# Patient Record
Sex: MALE | Race: WHITE
[De-identification: names, ages, dates, MRNs, and addresses within clinical notes are randomized per-mention and may not be internally consistent; named-entity substitution may affect disease eponyms.]

---

## 2017-08-20 ENCOUNTER — HOSPITAL ENCOUNTER (EMERGENCY)
Dept: HOSPITAL 47 - EC | Age: 37
Discharge: HOME | End: 2017-08-20
Payer: COMMERCIAL

## 2017-08-20 VITALS
SYSTOLIC BLOOD PRESSURE: 136 MMHG | DIASTOLIC BLOOD PRESSURE: 96 MMHG | TEMPERATURE: 97.3 F | RESPIRATION RATE: 18 BRPM | HEART RATE: 77 BPM

## 2017-08-20 DIAGNOSIS — H57.11: Primary | ICD-10-CM

## 2017-08-20 DIAGNOSIS — Z79.899: ICD-10-CM

## 2017-08-20 DIAGNOSIS — F41.9: ICD-10-CM

## 2017-08-20 DIAGNOSIS — I10: ICD-10-CM

## 2017-08-20 DIAGNOSIS — Z88.0: ICD-10-CM

## 2017-08-20 DIAGNOSIS — K21.9: ICD-10-CM

## 2017-08-20 PROCEDURE — 70480 CT ORBIT/EAR/FOSSA W/O DYE: CPT

## 2017-08-20 PROCEDURE — 99283 EMERGENCY DEPT VISIT LOW MDM: CPT

## 2017-08-20 NOTE — ED
General Adult HPI





- General


Chief complaint: Eye Problems


Stated complaint: FB RT EYE - [facility]


Time Seen by Provider: 08/20/17 11:51


Source: patient, RN notes reviewed


Mode of arrival: ambulatory


Limitations: no limitations





- History of Present Illness


Initial comments: 





Patient 37-year-old male who presents emergency room today with a chief 

complaint of right eye irritation off-and-on over the last 4 days.  Patient 

does admit that he was at work 4 days ago.  He states he had safety goggles on 

using a grinding wheel above his head.  He states he felt something instantly 

into the right eye.  He states he went to flush out.  He states it lasted for 

approximately 20 minutes and then began feeling better.  He states that he's 

had the symptoms come and go over the last 4 days.  He states at this time is 

currently feeling fine.  He states at times she feels a "sharp" type pain in 

the right eye.  Patient denies any other complaints or symptoms. Patient denies 

any recent fever, chills, shortness of breath, chest pain, back pain, abdominal 

pain, nausea or vomiting, numbness or tingling, dysuria or hematuria, 

constipation or diarrhea, headaches, or any other complaints.





- Related Data


 Home Medications











 Medication  Instructions  Recorded  Confirmed


 


Citalopram Hydrobromide [CeleXA] 20 mg PO DAILY 08/20/17 08/20/17


 


Famotidine [Pepcid] 20 mg PO BID 08/20/17 08/20/17


 


Losartan-Hctz 50-12.5 mg [Hyzaar 1 tab PO DAILY 08/20/17 08/20/17





50-12.5]   








 Previous Rx's











 Medication  Instructions  Recorded


 


Tobramycin 0.3% Ophth Soln [Tobrex 1 - 2 drop BOTH EYES QID 7 Days 08/20/17





0.3% Ophth Soln]  











 Allergies











Allergy/AdvReac Type Severity Reaction Status Date / Time


 


Penicillins Allergy  Rash/Hives Verified 08/20/17 11:38














Review of Systems


ROS Statement: 


Those systems with pertinent positive or pertinent negative responses have been 

documented in the HPI.





ROS Other: All systems not noted in ROS Statement are negative.





Past Medical History


Past Medical History: GERD/Reflux, Hypertension


Additional Past Medical History / Comment(s): anxiety


History of Any Multi-Drug Resistant Organisms: None Reported


Past Surgical History: No Surgical Hx Reported, Tonsillectomy


Past Psychological History: No Psychological Hx Reported


Smoking Status: Never smoker


Past Alcohol Use History: Daily


Past Drug Use History: None Reported





General Exam





- General Exam Comments


Initial Comments: 





General:  The patient is awake and alert, in no distress, and does not appear 

acutely ill. 


Eye:  Pupils are equal, round and reactive to light, extra-ocular movements are 

intact.  No nystagmus.  Mild redness to the right conjunctiva.  No signs of 

icterus.  


Ears, nose, mouth and throat:  There are moist mucous membranes and no oral 

lesions. 


Neck:  The neck is supple, there is no tenderness or JVD.  


Cardiovascular:  There is a regular rate and rhythm. No murmur, rub or gallop 

is appreciated.


Respiratory:  Lungs are clear to auscultation, respirations are non-labored, 

breath sounds are equal.  No wheezes, stridor, rales, or rhonchi.


Musculoskeletal:  Normal ROM, no tenderness.  Strength 5/5. Sensation intact. 

Pulses equal bilaterally 2+.  


Neurological:  A&O x 3. CN II-XII intact, There are no obvious motor or sensory 

deficits. Coordination appears grossly intact. Speech is normal.


Skin:  Skin is warm and dry and no rashes or lesions are noted. 


Psychiatric:  Cooperative, appropriate mood & affect, normal judgment.  


Limitations: no limitations





Course


 Vital Signs











  08/20/17





  11:31


 


Temperature 97.3 F L


 


Pulse Rate 77


 


Respiratory 18





Rate 


 


Blood Pressure 136/96


 


O2 Sat by Pulse 99





Oximetry 














Procedures





- Procedures


Initial comment: 





Patient's right eye was stained and checked with forcing with Wood's lamp 

revealing no foreign bodies.  Lids were inverted and no foreign body seen.  

Slit lamp exam was also been performed revealing no abnormality.





Medical Decision Making





- Medical Decision Making





Case discussed in detail with attending physician Dr. Calixto.  Patient 

reexamined at this time shows no signs of distress resting comfortably.  Patient

's CT reviewed and does show no sign of foreign body to right eye.  Patient's 

pain and injury was to the right eye.  Patient's CT does reveal possible 

calcification versus metallic foreign body in the left.  This is felt to be 

most likely a calcification as there is no injury or trauma and no symptoms to 

the left eye. Patient will be started on antibiotic drops advised to follow-up 

with ophthalmologist.





Disposition


Clinical Impression: 


 Pain, eye, right





Disposition: HOME SELF-CARE


Condition: Good


Instructions:  Eye Foreign Body (ED)


Additional Instructions: 


Please use antibiotic drops as prescribed and follow-up the ophthalmologist 

tomorrow.  Please return to emergency room if any symptoms increase or worsen 

or for any other concerns.


Prescriptions: 


Tobramycin 0.3% Ophth Soln [Tobrex 0.3% Ophth Soln] 1 - 2 drop BOTH EYES QID 7 

Days


Referrals: 


Chiki Arroyo MD [Primary Care Provider] - 1-2 days


Donn Alvarado MD [STAFF PHYSICIAN] - 1-2 days


Time of Disposition: 13:51

## 2017-08-20 NOTE — CT
EXAMINATION TYPE: CT orbits wo con

 

DATE OF EXAM: 8/20/2017

 

COMPARISON: NONE

 

HISTORY: Foreign body Rt eye

 

CT DLP: 478.7 mGycm

Automated exposure control for dose reduction was used.

 

FINDINGS: 

Helical imaging through the orbits. Coronal reconstructions.

 

There is metallic density coursing along the distribution of the superior oblique muscle on the left 
which is asymmetric. Lack of contrast could limit the exam. The globes show symmetric appearance. No 
other radiopaque foreign body evident. Inflammatory change present within the left maxillary sinus. O
stiomeatal units are patent.

 

IMPRESSION: 

METALLIC DENSITY PRESENT ON THE LEFT MAY BE RELATED TO THE TROCHLEA OF THE SUPERIOR OBLIQUE MUSCLE AL
THOUGH THIS IS A SYMMETRIC APPEARANCE. POSSIBLE MUCUS RETENTION CYST OR POLYP IN THE LEFT MAXILLARY S
INUS.

## 2018-07-11 ENCOUNTER — HOSPITAL ENCOUNTER (EMERGENCY)
Dept: HOSPITAL 47 - EC | Age: 38
Discharge: HOME | End: 2018-07-11
Payer: MEDICARE

## 2018-07-11 VITALS — HEART RATE: 74 BPM | TEMPERATURE: 98.2 F | SYSTOLIC BLOOD PRESSURE: 119 MMHG | DIASTOLIC BLOOD PRESSURE: 74 MMHG

## 2018-07-11 VITALS — RESPIRATION RATE: 18 BRPM

## 2018-07-11 DIAGNOSIS — K57.92: Primary | ICD-10-CM

## 2018-07-11 DIAGNOSIS — Z98.52: ICD-10-CM

## 2018-07-11 DIAGNOSIS — K21.9: ICD-10-CM

## 2018-07-11 DIAGNOSIS — Z79.899: ICD-10-CM

## 2018-07-11 DIAGNOSIS — F41.9: ICD-10-CM

## 2018-07-11 DIAGNOSIS — I10: ICD-10-CM

## 2018-07-11 DIAGNOSIS — Z88.0: ICD-10-CM

## 2018-07-11 LAB
ALBUMIN SERPL-MCNC: 4.2 G/DL (ref 3.5–5)
ALP SERPL-CCNC: 65 U/L (ref 38–126)
ALT SERPL-CCNC: 72 U/L (ref 21–72)
AMYLASE SERPL-CCNC: 42 U/L (ref 30–110)
ANION GAP SERPL CALC-SCNC: 12 MMOL/L
APTT BLD: 24.5 SEC (ref 22–30)
AST SERPL-CCNC: 26 U/L (ref 17–59)
BUN SERPL-SCNC: 15 MG/DL (ref 9–20)
CALCIUM SPEC-MCNC: 9.4 MG/DL (ref 8.4–10.2)
CELLS COUNTED: 100
CHLORIDE SERPL-SCNC: 103 MMOL/L (ref 98–107)
CO2 SERPL-SCNC: 23 MMOL/L (ref 22–30)
EOSINOPHIL # BLD MANUAL: 0.13 K/UL (ref 0–0.7)
ERYTHROCYTE [DISTWIDTH] IN BLOOD BY AUTOMATED COUNT: 4.97 M/UL (ref 4.3–5.9)
ERYTHROCYTE [DISTWIDTH] IN BLOOD: 12.4 % (ref 11.5–15.5)
GLUCOSE SERPL-MCNC: 112 MG/DL (ref 74–99)
HCT VFR BLD AUTO: 44.2 % (ref 39–53)
HGB BLD-MCNC: 15.5 GM/DL (ref 13–17.5)
INR PPP: 1.1 (ref ?–1.2)
LIPASE SERPL-CCNC: 45 U/L (ref 23–300)
LYMPHOCYTES # BLD MANUAL: 4.03 K/UL (ref 1–4.8)
MCH RBC QN AUTO: 31.3 PG (ref 25–35)
MCHC RBC AUTO-ENTMCNC: 35.1 G/DL (ref 31–37)
MCV RBC AUTO: 89 FL (ref 80–100)
MONOCYTES # BLD MANUAL: 0.63 K/UL (ref 0–1)
NEUTROPHILS NFR BLD MANUAL: 62 %
NEUTS SEG # BLD MANUAL: 7.81 K/UL (ref 1.3–7.7)
PH UR: 5.5 [PH] (ref 5–8)
PLATELET # BLD AUTO: 279 K/UL (ref 150–450)
POTASSIUM SERPL-SCNC: 4.5 MMOL/L (ref 3.5–5.1)
PROT SERPL-MCNC: 7 G/DL (ref 6.3–8.2)
PT BLD: 10.3 SEC (ref 9–12)
RBC UR QL: <1 /HPF (ref 0–5)
SODIUM SERPL-SCNC: 138 MMOL/L (ref 137–145)
SP GR UR: 1.03 (ref 1–1.03)
UROBILINOGEN UR QL STRIP: <2 MG/DL (ref ?–2)
WBC # BLD AUTO: 12.6 K/UL (ref 3.8–10.6)
WBC #/AREA URNS HPF: <1 /HPF (ref 0–5)

## 2018-07-11 PROCEDURE — 99284 EMERGENCY DEPT VISIT MOD MDM: CPT

## 2018-07-11 PROCEDURE — 83690 ASSAY OF LIPASE: CPT

## 2018-07-11 PROCEDURE — 85610 PROTHROMBIN TIME: CPT

## 2018-07-11 PROCEDURE — 80053 COMPREHEN METABOLIC PANEL: CPT

## 2018-07-11 PROCEDURE — 87040 BLOOD CULTURE FOR BACTERIA: CPT

## 2018-07-11 PROCEDURE — 96360 HYDRATION IV INFUSION INIT: CPT

## 2018-07-11 PROCEDURE — 36415 COLL VENOUS BLD VENIPUNCTURE: CPT

## 2018-07-11 PROCEDURE — 85730 THROMBOPLASTIN TIME PARTIAL: CPT

## 2018-07-11 PROCEDURE — 85025 COMPLETE CBC W/AUTO DIFF WBC: CPT

## 2018-07-11 PROCEDURE — 81001 URINALYSIS AUTO W/SCOPE: CPT

## 2018-07-11 PROCEDURE — 82150 ASSAY OF AMYLASE: CPT

## 2018-07-11 PROCEDURE — 83605 ASSAY OF LACTIC ACID: CPT

## 2018-07-11 PROCEDURE — 74177 CT ABD & PELVIS W/CONTRAST: CPT

## 2018-07-11 NOTE — CT
EXAMINATION TYPE: CT abdomen pelvis w con

 

DATE OF EXAM: 7/11/2018

 

COMPARISON: None

 

HISTORY: Left lower quadrant pain since 4:00 AM.

 

CT DLP: 1768.7 mGycm, Automated Exposure Control for Dose Reduction was Utilized.

 

CONTRAST: 

CT scan of the abdomen and pelvis is performed without oral but with IV Contrast, patient injected wi
th 100 mL of Isovue 300.

 

FINDINGS:

 

LUNG BASES: Dependent atelectasis is present bilaterally .

 

LIVER/GB: Liver is diffusely low dense consistent with fatty infiltration.

 

PANCREAS:  No significant abnormality is seen.

 

SPLEEN:  No significant abnormality is seen.

 

ADRENALS:  No significant abnormality is seen.

 

KIDNEYS:  No significant abnormality is seen.

 

BOWEL: Evaluation of bowel is slightly suboptimal secondary to lack of enteric contrast. There is non
distended stomach seen. There is no suspicious small bowel dilatation. 

 

There are a few scattered diverticula in the colon. There is a prominent diverticulum left lower quad
rant near junction of left and sigmoid colon axial image 42 with moderate ill-defined fluid and fat s
tranding. Findings are consistent with acute diverticulitis. There is mild wall thickening of the col
on from the splenic flexure through the rectum. This is presumed product of poor distention, a mild u
nderlying long segment colitis is not excluded. There is no well-formed fluid collection or abscess. 
There is no pneumoperitoneum identified.

 

PROSTATE/SEMINAL VESICLES:  No gross abnormality seen.

 

LYMPH NODES:  No greater than 1cm abdominal or pelvic lymph nodes are appreciated.

 

OSSEOUS STRUCTURES: There is transitional type L5 vertebra which is sacralized on the left.

 

OTHER: There is small amount of free fluid in pelvis axial image 84.

 

IMPRESSION: CT findings are consistent with a focal moderate acute diverticulitis in the left lower q
uadrant near junction of left and sigmoid colon.

## 2018-07-11 NOTE — ED
Abdominal Pain HPI





- General


Chief Complaint: Abdominal Pain


Stated Complaint: LLQ PAIN


Time Seen by Provider: 07/11/18 09:37


Source: patient, RN notes reviewed


Mode of arrival: ambulatory


Limitations: no limitations





- History of Present Illness


Initial Comments: 





38-year-old male present emergency department with chief complaint of left 

lower quadrant abdominal pain.  He states he had no symptoms yesterday but 

states that he woke up around 4:00 this morning with stabbing pain in his left 

lower quadrant.  He has no history of diverticulitis or bowel infections.  

Denies any recent injury.  He states he did have a vasectomy 2 weeks ago with 

symptoms of swelling and pain have improved from that.  Patient reports fever 

no chills no night sweats.  Denies any change of bowel habits including 

diarrhea melena mucus or blood.  Patient states he's been urinating fine no 

dysuria no hematuria or urinary frequency.  Patient states he has no back pain 

or flank pain and no history kidney stones.





- Related Data


 Home Medications











 Medication  Instructions  Recorded  Confirmed


 


Citalopram Hydrobromide [CeleXA] 20 mg PO DAILY 08/20/17 07/11/18


 


Famotidine [Pepcid] 20 mg PO BID 08/20/17 07/11/18


 


Losartan-Hctz 50-12.5 mg [Hyzaar 1 tab PO DAILY 08/20/17 07/11/18





50-12.5]   








 Previous Rx's











 Medication  Instructions  Recorded


 


Ciprofloxacin HCl [Cipro] 500 mg PO Q12HR #20 tablet 07/11/18


 


metroNIDAZOLE [Flagyl] 500 mg PO TID #30 tab 07/11/18











 Allergies











Allergy/AdvReac Type Severity Reaction Status Date / Time


 


Penicillins Allergy  Rash/Hives Verified 07/11/18 10:03














Review of Systems


ROS Statement: 


Those systems with pertinent positive or pertinent negative responses have been 

documented in the HPI.





ROS Other: All systems not noted in ROS Statement are negative.





Past Medical History


Past Medical History: GERD/Reflux, Hypertension


Additional Past Medical History / Comment(s): anxiety


History of Any Multi-Drug Resistant Organisms: None Reported


Past Surgical History: Tonsillectomy


Additional Past Surgical History / Comment(s): vasectomy


Past Psychological History: No Psychological Hx Reported


Smoking Status: Never smoker


Past Alcohol Use History: Daily


Past Drug Use History: None Reported





General Exam


Limitations: no limitations


General appearance: alert, in no apparent distress


Respiratory exam: Present: normal lung sounds bilaterally.  Absent: respiratory 

distress, wheezes, rales, rhonchi, stridor


Cardiovascular Exam: Present: regular rate, normal rhythm, normal heart sounds.

  Absent: systolic murmur, diastolic murmur, rubs, gallop, clicks


GI/Abdominal exam: Present: soft, tenderness (Moderate left lower quadrant 

tenderness), normal bowel sounds.  Absent: distended, guarding, rebound, rigid


Back exam: Absent: CVA tenderness (R), CVA tenderness (L)


Skin exam: Present: warm, dry, intact, normal color.  Absent: rash





Course


 Vital Signs











  07/11/18 07/11/18





  09:33 11:23


 


Temperature 98.3 F 98.2 F


 


Pulse Rate 88 74


 


Respiratory 18 18





Rate  


 


Blood Pressure 124/84 119/74


 


O2 Sat by Pulse 97 97





Oximetry  














Medical Decision Making





- Medical Decision Making





38-year-old male present emergency from for left lower quadrant abdominal pain.

  Patient had laboratory, CT which shows evidence of moderate diverticulitis.  

There is no abscess or perforation.  Patient will be started on Cipro, Flagyl 

and follow-up with on-call surgery.  Return parameters were discussed.





- Lab Data


Result diagrams: 


 07/11/18 10:21





 07/11/18 10:21


 Lab Results











  07/11/18 07/11/18 07/11/18 Range/Units





  10:21 10:21 10:21 


 


WBC   12.6 H   (3.8-10.6)  k/uL


 


RBC   4.97   (4.30-5.90)  m/uL


 


Hgb   15.5   (13.0-17.5)  gm/dL


 


Hct   44.2   (39.0-53.0)  %


 


MCV   89.0   (80.0-100.0)  fL


 


MCH   31.3   (25.0-35.0)  pg


 


MCHC   35.1   (31.0-37.0)  g/dL


 


RDW   12.4   (11.5-15.5)  %


 


Plt Count   279   (150-450)  k/uL


 


PT     (9.0-12.0)  sec


 


INR     (<1.2)  


 


APTT     (22.0-30.0)  sec


 


Sodium  138    (137-145)  mmol/L


 


Potassium  4.5    (3.5-5.1)  mmol/L


 


Chloride  103    ()  mmol/L


 


Carbon Dioxide  23    (22-30)  mmol/L


 


Anion Gap  12    mmol/L


 


BUN  15    (9-20)  mg/dL


 


Creatinine  0.74    (0.66-1.25)  mg/dL


 


Est GFR (CKD-EPI)AfAm  >90    (>60 ml/min/1.73 sqM)  


 


Est GFR (CKD-EPI)NonAf  >90    (>60 ml/min/1.73 sqM)  


 


Glucose  112 H    (74-99)  mg/dL


 


Plasma Lactic Acid Efrain    0.6 L  (0.7-2.0)  mmol/L


 


Calcium  9.4    (8.4-10.2)  mg/dL


 


Total Bilirubin  1.3    (0.2-1.3)  mg/dL


 


AST  26    (17-59)  U/L


 


ALT  72    (21-72)  U/L


 


Alkaline Phosphatase  65    ()  U/L


 


Total Protein  7.0    (6.3-8.2)  g/dL


 


Albumin  4.2    (3.5-5.0)  g/dL


 


Amylase  42    ()  U/L


 


Lipase  45    ()  U/L


 


Urine Color     


 


Urine Appearance     (Clear)  


 


Urine pH     (5.0-8.0)  


 


Ur Specific Gravity     (1.001-1.035)  


 


Urine Protein     (Negative)  


 


Urine Glucose (UA)     (Negative)  


 


Urine Ketones     (Negative)  


 


Urine Blood     (Negative)  


 


Urine Nitrite     (Negative)  


 


Urine Bilirubin     (Negative)  


 


Urine Urobilinogen     (<2.0)  mg/dL


 


Ur Leukocyte Esterase     (Negative)  


 


Urine RBC     (0-5)  /hpf


 


Urine WBC     (0-5)  /hpf


 


Urine Mucus     (None)  /hpf














  07/11/18 07/11/18 Range/Units





  10:21 10:21 


 


WBC    (3.8-10.6)  k/uL


 


RBC    (4.30-5.90)  m/uL


 


Hgb    (13.0-17.5)  gm/dL


 


Hct    (39.0-53.0)  %


 


MCV    (80.0-100.0)  fL


 


MCH    (25.0-35.0)  pg


 


MCHC    (31.0-37.0)  g/dL


 


RDW    (11.5-15.5)  %


 


Plt Count    (150-450)  k/uL


 


PT  10.3   (9.0-12.0)  sec


 


INR  1.1   (<1.2)  


 


APTT  24.5   (22.0-30.0)  sec


 


Sodium    (137-145)  mmol/L


 


Potassium    (3.5-5.1)  mmol/L


 


Chloride    ()  mmol/L


 


Carbon Dioxide    (22-30)  mmol/L


 


Anion Gap    mmol/L


 


BUN    (9-20)  mg/dL


 


Creatinine    (0.66-1.25)  mg/dL


 


Est GFR (CKD-EPI)AfAm    (>60 ml/min/1.73 sqM)  


 


Est GFR (CKD-EPI)NonAf    (>60 ml/min/1.73 sqM)  


 


Glucose    (74-99)  mg/dL


 


Plasma Lactic Acid Efrain    (0.7-2.0)  mmol/L


 


Calcium    (8.4-10.2)  mg/dL


 


Total Bilirubin    (0.2-1.3)  mg/dL


 


AST    (17-59)  U/L


 


ALT    (21-72)  U/L


 


Alkaline Phosphatase    ()  U/L


 


Total Protein    (6.3-8.2)  g/dL


 


Albumin    (3.5-5.0)  g/dL


 


Amylase    ()  U/L


 


Lipase    ()  U/L


 


Urine Color   Yellow  


 


Urine Appearance   Clear  (Clear)  


 


Urine pH   5.5  (5.0-8.0)  


 


Ur Specific Gravity   1.026  (1.001-1.035)  


 


Urine Protein   Trace H  (Negative)  


 


Urine Glucose (UA)   Negative  (Negative)  


 


Urine Ketones   Negative  (Negative)  


 


Urine Blood   Trace H  (Negative)  


 


Urine Nitrite   Negative  (Negative)  


 


Urine Bilirubin   Negative  (Negative)  


 


Urine Urobilinogen   <2.0  (<2.0)  mg/dL


 


Ur Leukocyte Esterase   Negative  (Negative)  


 


Urine RBC   <1  (0-5)  /hpf


 


Urine WBC   <1  (0-5)  /hpf


 


Urine Mucus   Many H  (None)  /hpf














Disposition


Clinical Impression: 


 Diverticulitis





Disposition: HOME SELF-CARE


Condition: Stable


Instructions:  Diverticulitis Diet (ED), Diverticulitis (ED)


Additional Instructions: 


Please return to the Emergency Department if symptoms worsen or any other 

concerns.


Prescriptions: 


Ciprofloxacin HCl [Cipro] 500 mg PO Q12HR #20 tablet


metroNIDAZOLE [Flagyl] 500 mg PO TID #30 tab


Is patient prescribed a controlled substance at d/c from ED?: No


Referrals: 


Chiki Arroyo MD [Primary Care Provider] - 1-2 days


Laurie Galaviz MD [STAFF PHYSICIAN] - 1-2 days


Time of Disposition: 11:45

## 2020-03-03 ENCOUNTER — HOSPITAL ENCOUNTER (EMERGENCY)
Dept: HOSPITAL 47 - EC | Age: 40
Discharge: HOME | End: 2020-03-03
Payer: MEDICARE

## 2020-03-03 VITALS
RESPIRATION RATE: 17 BRPM | HEART RATE: 71 BPM | TEMPERATURE: 98.1 F | SYSTOLIC BLOOD PRESSURE: 118 MMHG | DIASTOLIC BLOOD PRESSURE: 76 MMHG

## 2020-03-03 DIAGNOSIS — F41.9: ICD-10-CM

## 2020-03-03 DIAGNOSIS — I10: ICD-10-CM

## 2020-03-03 DIAGNOSIS — Z88.0: ICD-10-CM

## 2020-03-03 DIAGNOSIS — R11.0: ICD-10-CM

## 2020-03-03 DIAGNOSIS — Z79.899: ICD-10-CM

## 2020-03-03 DIAGNOSIS — N50.811: Primary | ICD-10-CM

## 2020-03-03 DIAGNOSIS — K21.9: ICD-10-CM

## 2020-03-03 LAB
ALBUMIN SERPL-MCNC: 4.4 G/DL (ref 3.5–5)
ALP SERPL-CCNC: 89 U/L (ref 38–126)
ALT SERPL-CCNC: 82 U/L (ref 4–49)
AMYLASE SERPL-CCNC: 42 U/L (ref 30–110)
ANION GAP SERPL CALC-SCNC: 10 MMOL/L
APTT BLD: 22.7 SEC (ref 22–30)
AST SERPL-CCNC: 39 U/L (ref 17–59)
BUN SERPL-SCNC: 16 MG/DL (ref 9–20)
CALCIUM SPEC-MCNC: 9 MG/DL (ref 8.4–10.2)
CELLS COUNTED: 100
CHLORIDE SERPL-SCNC: 100 MMOL/L (ref 98–107)
CO2 SERPL-SCNC: 26 MMOL/L (ref 22–30)
ERYTHROCYTE [DISTWIDTH] IN BLOOD BY AUTOMATED COUNT: 5.37 M/UL (ref 4.3–5.9)
ERYTHROCYTE [DISTWIDTH] IN BLOOD: 11.7 % (ref 11.5–15.5)
GLUCOSE SERPL-MCNC: 118 MG/DL (ref 74–99)
HCT VFR BLD AUTO: 46.2 % (ref 39–53)
HGB BLD-MCNC: 16.3 GM/DL (ref 13–17.5)
INR PPP: 1 (ref ?–1.2)
LYMPHOCYTES # BLD MANUAL: 2.91 K/UL (ref 1–4.8)
MCH RBC QN AUTO: 30.3 PG (ref 25–35)
MCHC RBC AUTO-ENTMCNC: 35.2 G/DL (ref 31–37)
MCV RBC AUTO: 86 FL (ref 80–100)
MONOCYTES # BLD MANUAL: 0.58 K/UL (ref 0–1)
NEUTROPHILS NFR BLD MANUAL: 58 %
NEUTS SEG # BLD MANUAL: 4.81 K/UL (ref 1.3–7.7)
PH UR: 5.5 [PH] (ref 5–8)
PLATELET # BLD AUTO: 249 K/UL (ref 150–450)
POTASSIUM SERPL-SCNC: 3.6 MMOL/L (ref 3.5–5.1)
PROT SERPL-MCNC: 7.3 G/DL (ref 6.3–8.2)
PROT UR QL: (no result)
PT BLD: 10.7 SEC (ref 9–12)
SODIUM SERPL-SCNC: 136 MMOL/L (ref 137–145)
SP GR UR: 1.04 (ref 1–1.03)
SQUAMOUS UR QL AUTO: 1 /HPF (ref 0–4)
UROBILINOGEN UR QL STRIP: 2 MG/DL (ref ?–2)
WBC # BLD AUTO: 8.3 K/UL (ref 3.8–10.6)
WBC # UR AUTO: 1 /HPF (ref 0–5)

## 2020-03-03 PROCEDURE — 81001 URINALYSIS AUTO W/SCOPE: CPT

## 2020-03-03 PROCEDURE — 96374 THER/PROPH/DIAG INJ IV PUSH: CPT

## 2020-03-03 PROCEDURE — 85730 THROMBOPLASTIN TIME PARTIAL: CPT

## 2020-03-03 PROCEDURE — 99285 EMERGENCY DEPT VISIT HI MDM: CPT

## 2020-03-03 PROCEDURE — 82150 ASSAY OF AMYLASE: CPT

## 2020-03-03 PROCEDURE — 93975 VASCULAR STUDY: CPT

## 2020-03-03 PROCEDURE — 82272 OCCULT BLD FECES 1-3 TESTS: CPT

## 2020-03-03 PROCEDURE — 96375 TX/PRO/DX INJ NEW DRUG ADDON: CPT

## 2020-03-03 PROCEDURE — 74018 RADEX ABDOMEN 1 VIEW: CPT

## 2020-03-03 PROCEDURE — 76870 US EXAM SCROTUM: CPT

## 2020-03-03 PROCEDURE — 80053 COMPREHEN METABOLIC PANEL: CPT

## 2020-03-03 PROCEDURE — 96361 HYDRATE IV INFUSION ADD-ON: CPT

## 2020-03-03 PROCEDURE — 85025 COMPLETE CBC W/AUTO DIFF WBC: CPT

## 2020-03-03 PROCEDURE — 83690 ASSAY OF LIPASE: CPT

## 2020-03-03 PROCEDURE — 74177 CT ABD & PELVIS W/CONTRAST: CPT

## 2020-03-03 PROCEDURE — 85610 PROTHROMBIN TIME: CPT

## 2020-03-03 PROCEDURE — 36415 COLL VENOUS BLD VENIPUNCTURE: CPT

## 2020-03-03 NOTE — ED
Abdominal Pain HPI





- General


Chief Complaint: Abdominal Pain


Stated Complaint: abdominal pain


Time Seen by Provider: 03/03/20 08:53


Source: patient, RN notes reviewed, old records reviewed


Mode of arrival: ambulatory


Limitations: no limitations





- History of Present Illness


Initial Comments: 





Patient is a pleasant 39-year-old male presents emergency department today for 

evaluation for 3 days of general nausea, "knots in his stomach and ".  Patient 

reports that he's had some soft stools yesterday.  Patient reports that his main

concern was the pain seemed to be worsening in his lower abdomen and into his 

right scrotum and right testicle over the past few hours.  Patient reports he 

just got off of a night shift as a .  Patient reports that he has had

no specific fevers or chills.  He did notice that his urine has been somewhat 

darker than normal.  Patient denies any associated chest pain or shortness of 

breath.





- Related Data


                                Home Medications











 Medication  Instructions  Recorded  Confirmed


 


Citalopram Hydrobromide [CeleXA] 20 mg PO DAILY 08/20/17 07/11/18


 


Famotidine [Pepcid] 20 mg PO BID 08/20/17 07/11/18


 


Losartan-Hctz 50-12.5 mg [Hyzaar 1 tab PO DAILY 08/20/17 07/11/18





50-12.5]   








                                  Previous Rx's











 Medication  Instructions  Recorded


 


Ciprofloxacin HCl [Cipro] 500 mg PO Q12HR #20 tablet 07/11/18


 


metroNIDAZOLE [Flagyl] 500 mg PO TID #30 tab 07/11/18


 


Ibuprofen [Motrin] 600 mg PO Q8HR PRN #30 tab 03/03/20











                                    Allergies











Allergy/AdvReac Type Severity Reaction Status Date / Time


 


Penicillins Allergy  Rash/Hives Verified 03/03/20 08:52














Review of Systems


ROS Statement: 


Those systems with pertinent positive or pertinent negative responses have been 

documented in the HPI.





ROS Other: All systems not noted in ROS Statement are negative.





Past Medical History


Past Medical History: GERD/Reflux, Hypertension


Additional Past Medical History / Comment(s): anxiety


History of Any Multi-Drug Resistant Organisms: None Reported


Past Surgical History: Tonsillectomy


Additional Past Surgical History / Comment(s): vasectomy


Past Psychological History: No Psychological Hx Reported


Smoking Status: Never smoker


Past Alcohol Use History: Occasional


Past Drug Use History: None Reported





General Exam





- General Exam Comments


Initial Comments: 





39 year old male no distress.


Limitations: no limitations


General appearance: alert, in no apparent distress


Head exam: Present: atraumatic, normocephalic, normal inspection


Eye exam: Present: normal appearance, PERRL, EOMI.  Absent: scleral icterus, 

conjunctival injection, periorbital swelling


ENT exam: Present: normal exam, mucous membranes moist


Neck exam: Present: normal inspection.  Absent: tenderness, meningismus, 

lymphadenopathy


Respiratory exam: Present: normal lung sounds bilaterally.  Absent: respiratory 

distress, wheezes, rales, rhonchi, stridor


Cardiovascular Exam: Present: regular rate, normal rhythm, normal heart sounds. 

 Absent: systolic murmur, diastolic murmur, rubs, gallop, clicks


GI/Abdominal exam: Present: soft, normal bowel sounds.  Absent: distended, 

tenderness, guarding, rebound, rigid


 exam: Present: testicular tenderness (right )


Extremities exam: Present: normal inspection, full ROM, normal capillary refill.

  Absent: tenderness, pedal edema, joint swelling, calf tenderness


Back exam: Present: normal inspection


Neurological exam: Present: alert, oriented X3, CN II-XII intact


Psychiatric exam: Present: normal affect, normal mood


Skin exam: Present: warm, dry, intact, normal color.  Absent: rash





Course


                                   Vital Signs











  03/03/20 03/03/20





  08:49 12:59


 


Temperature 98.6 F 98.1 F


 


Pulse Rate 79 71


 


Respiratory 18 17





Rate  


 


Blood Pressure 132/72 118/76


 


O2 Sat by Pulse 98 95





Oximetry  














Medical Decision Making





- Medical Decision Making





39 year old male presents with nausea, testicular pain starting over 8 hours 

ago. Patient had normal labs and normal ua. Patient scrotal us shows no torsion.

 evidence of varicocele.Patient had ct showing possible small bowel ileus. 

Patient has no vomiting. Discussed case with doctor kush, whom also examined 

patient. Discussed for testicular pain to use antiinflammatory medication. 





- Lab Data


Result diagrams: 


                                 03/03/20 09:34





                                 03/03/20 09:34


                                   Lab Results











  03/03/20 03/03/20 03/03/20 Range/Units





  09:34 09:34 09:34 


 


WBC  8.3    (3.8-10.6)  k/uL


 


RBC  5.37    (4.30-5.90)  m/uL


 


Hgb  16.3    (13.0-17.5)  gm/dL


 


Hct  46.2    (39.0-53.0)  %


 


MCV  86.0    (80.0-100.0)  fL


 


MCH  30.3    (25.0-35.0)  pg


 


MCHC  35.2    (31.0-37.0)  g/dL


 


RDW  11.7    (11.5-15.5)  %


 


Plt Count  249    (150-450)  k/uL


 


Neutrophils % (Manual)  58    %


 


Lymphocytes % (Manual)  35    %


 


Monocytes % (Manual)  7    %


 


Neutrophils # (Manual)  4.81    (1.3-7.7)  k/uL


 


Lymphocytes # (Manual)  2.91    (1.0-4.8)  k/uL


 


Monocytes # (Manual)  0.58    (0-1.0)  k/uL


 


Nucleated RBCs  0    (0-0)  /100 WBC


 


Manual Slide Review  Performed    


 


RBC Morphology  Normal    


 


PT   10.7   (9.0-12.0)  sec


 


INR   1.0   (<1.2)  


 


APTT   22.7   (22.0-30.0)  sec


 


Sodium     (137-145)  mmol/L


 


Potassium     (3.5-5.1)  mmol/L


 


Chloride     ()  mmol/L


 


Carbon Dioxide     (22-30)  mmol/L


 


Anion Gap     mmol/L


 


BUN     (9-20)  mg/dL


 


Creatinine     (0.66-1.25)  mg/dL


 


Est GFR (CKD-EPI)AfAm     (>60 ml/min/1.73 sqM)  


 


Est GFR (CKD-EPI)NonAf     (>60 ml/min/1.73 sqM)  


 


Glucose     (74-99)  mg/dL


 


Calcium     (8.4-10.2)  mg/dL


 


Total Bilirubin     (0.2-1.3)  mg/dL


 


AST     (17-59)  U/L


 


ALT     (4-49)  U/L


 


Alkaline Phosphatase     ()  U/L


 


Total Protein     (6.3-8.2)  g/dL


 


Albumin     (3.5-5.0)  g/dL


 


Amylase     ()  U/L


 


Lipase     ()  U/L


 


Urine Color    Yellow  


 


Urine Appearance    Clear  (Clear)  


 


Urine pH    5.5  (5.0-8.0)  


 


Ur Specific Gravity    1.040 H  (1.001-1.035)  


 


Urine Protein    1+ H  (Negative)  


 


Urine Glucose (UA)    Negative  (Negative)  


 


Urine Ketones    Negative  (Negative)  


 


Urine Blood    Negative  (Negative)  


 


Urine Nitrite    Negative  (Negative)  


 


Urine Bilirubin    Negative  (Negative)  


 


Urine Urobilinogen    2.0  (<2.0)  mg/dL


 


Ur Leukocyte Esterase    Negative  (Negative)  


 


Urine WBC    1  (0-5)  /hpf


 


Ur Squamous Epith Cells    1  (0-4)  /hpf


 


Urine Mucus    Many H  (None)  /hpf


 


Stool Occult Blood     (Negative)  














  03/03/20 03/03/20 Range/Units





  09:34 09:34 


 


WBC    (3.8-10.6)  k/uL


 


RBC    (4.30-5.90)  m/uL


 


Hgb    (13.0-17.5)  gm/dL


 


Hct    (39.0-53.0)  %


 


MCV    (80.0-100.0)  fL


 


MCH    (25.0-35.0)  pg


 


MCHC    (31.0-37.0)  g/dL


 


RDW    (11.5-15.5)  %


 


Plt Count    (150-450)  k/uL


 


Neutrophils % (Manual)    %


 


Lymphocytes % (Manual)    %


 


Monocytes % (Manual)    %


 


Neutrophils # (Manual)    (1.3-7.7)  k/uL


 


Lymphocytes # (Manual)    (1.0-4.8)  k/uL


 


Monocytes # (Manual)    (0-1.0)  k/uL


 


Nucleated RBCs    (0-0)  /100 WBC


 


Manual Slide Review    


 


RBC Morphology    


 


PT    (9.0-12.0)  sec


 


INR    (<1.2)  


 


APTT    (22.0-30.0)  sec


 


Sodium  136 L   (137-145)  mmol/L


 


Potassium  3.6   (3.5-5.1)  mmol/L


 


Chloride  100   ()  mmol/L


 


Carbon Dioxide  26   (22-30)  mmol/L


 


Anion Gap  10   mmol/L


 


BUN  16   (9-20)  mg/dL


 


Creatinine  0.66   (0.66-1.25)  mg/dL


 


Est GFR (CKD-EPI)AfAm  >90   (>60 ml/min/1.73 sqM)  


 


Est GFR (CKD-EPI)NonAf  >90   (>60 ml/min/1.73 sqM)  


 


Glucose  118 H   (74-99)  mg/dL


 


Calcium  9.0   (8.4-10.2)  mg/dL


 


Total Bilirubin  0.5   (0.2-1.3)  mg/dL


 


AST  39   (17-59)  U/L


 


ALT  82 H   (4-49)  U/L


 


Alkaline Phosphatase  89   ()  U/L


 


Total Protein  7.3   (6.3-8.2)  g/dL


 


Albumin  4.4   (3.5-5.0)  g/dL


 


Amylase  42   ()  U/L


 


Lipase  115   ()  U/L


 


Urine Color    


 


Urine Appearance    (Clear)  


 


Urine pH    (5.0-8.0)  


 


Ur Specific Gravity    (1.001-1.035)  


 


Urine Protein    (Negative)  


 


Urine Glucose (UA)    (Negative)  


 


Urine Ketones    (Negative)  


 


Urine Blood    (Negative)  


 


Urine Nitrite    (Negative)  


 


Urine Bilirubin    (Negative)  


 


Urine Urobilinogen    (<2.0)  mg/dL


 


Ur Leukocyte Esterase    (Negative)  


 


Urine WBC    (0-5)  /hpf


 


Ur Squamous Epith Cells    (0-4)  /hpf


 


Urine Mucus    (None)  /hpf


 


Stool Occult Blood   Negative  (Negative)  














- Radiology Data


Radiology results: report reviewed


KUB shows normal bowel gas pattern. US scrotum shows varicocele.  CT shows 

multiple loops of upper limits normal size bowel inlet abdomen with airlfluid 

levels and decompressed small bowel in right lower quadrant. No transition point

 seen and small bowel ileum is favored. hepatic steatosis appearing moderate 

grade with probable focal fatty sparing near gallbladder fossa. 





Disposition


Clinical Impression: 


 Testicular pain, Nausea





Disposition: HOME SELF-CARE


Condition: Good


Instructions (If sedation given, give patient instructions):  Varicocele (ED), 

Testicle Pain (ED)


Additional Instructions: 


Please use medication as discussed. Please follow up with family doctor if 

symptoms have not improved over the next two days. Please return to the 

emergency room if your symptoms increase or worsen or for any other concern.


Prescriptions: 


Ibuprofen [Motrin] 600 mg PO Q8HR PRN #30 tab


 PRN Reason: Pain


Is patient prescribed a controlled substance at d/c from ED?: No


Referrals: 


Chiki Arroyo MD [Primary Care Provider] - 1-2 days


Time of Disposition: 12:45

## 2020-03-03 NOTE — XR
EXAMINATION TYPE: XR KUB

 

DATE OF EXAM: 3/3/2020

 

COMPARISON: None

 

INDICATION: Abdomen pain x4 days

 

TECHNIQUE: Single view abdomen upright view

 

FINDINGS:  

Colonic bowel gas is present. No suspicious differential air-fluid levels are present. No mass effect
 is evident

Psoas margins are normal.

No organomegaly is present.

 

 

IMPRESSION: 

1. Nonspecific colonic bowel gas.

## 2020-03-03 NOTE — US
EXAMINATION TYPE: US scrotum with doppler.  Grayscale and color Doppler Duplex imaging performed of t
he scrotum.

 

DATE OF EXAM: 3/3/2020

 

COMPARISON: NONE

 

CLINICAL HISTORY: right testicular pain.

 

 

EXAM MEASUREMENTS:

 

TESTICLES:

Right Testicle:  3.7 x 2.0 x 2.9 cm

Left Testicle:  4.2 x 1.9 x 2.7 cm

 

EPIDIDYMIS HEAD:

Right Epididymis:  0.6 cm

Left Epididymis:  0.8 cm  

 

Doppler performed to assess for testicular vascularity; good bilateral color flow and waveforms are s
een.   There is no evidence of testicular torsion.

 

Presence of hydroceles:  No

Presence of varicoceles:  Yes, small on the left 

 

Slightly prominent and hypervascular right side epididymis, possible right sided epididymitis 

 

 

 

IMPRESSION: 

1. Small left-sided varicoceles

## 2020-03-03 NOTE — CT
EXAMINATION TYPE: CT abdomen pelvis w con

 

DATE OF EXAM: 3/3/2020

 

COMPARISON: Scrotal ultrasound of the same date and CT dated 7/11/2018

 

HISTORY: scrotal pain

 

CT DLP: 1778 mGycm

Automated exposure control for dose reduction was used.

 

TECHNIQUE:  Helical acquisition of images was performed from the lung bases through the pelvis.

 

CONTRAST: 

Performed without Oral Contrast and with IV Contrast, patient injected with 100 mL of Isovue 300.

 

FINDINGS: 

 

LUNG BASES: Minimal bibasilar subsegmental dependent atelectasis

 

LIVER/GB: Hepatic parenchyma is diffusely hypoattenuated in comparison to that of the spleen, most co
mmonly seen in hepatic steatosis. This finding limits evaluation for hepatic masses. There is probabl
e focal fatty sparing around the gallbladder fossa with relative hyperattenuation. No intrahepatic bi
liary ductal dilatation. No cholelithiasis.

 

PANCREAS: No significant abnormality is seen.

 

SPLEEN: No significant abnormality is seen.

 

ADRENALS: No nodularity or thickening.

 

KIDNEYS: No hydronephrosis or nephrolithiasis.

 

FREE AIR:  No free air is visualized.

 

ADENOPATHY:  No greater than 1 cm short axis lymph node in the abdomen or pelvis.

 

OSSEOUS STRUCTURES:  Nonspecific lucent lesion of the left iliac bone measures 8 mm appearing stable 
from 2018.

 

BOWEL:  Small bowel loops are upper limits of normal size containing few air-fluid levels in the left
 mid abdomen. Although no transition point is seen the small bowel loops in the right mid abdomen and
 right lower quadrant are decompressed. There is an appendicolith in the nondilated appendix. No brendan
appendiceal fat stranding. Descending colon is decompressed.

 

IMPRESSION: 

1. MULTIPLE LOOPS OF UPPER LIMITS NORMAL SIZE SMALL BOWEL IN THE LEFT MID ABDOMEN WITH AIR-FLUID LEVE
LS AND DECOMPRESSED SMALL BOWEL IN THE RIGHT LOWER QUADRANT. NO DISTINCT TRANSITION POINT IS SEEN AND
 SMALL BOWEL ILEUS IS FAVORED.

2. HEPATIC STEATOSIS APPEARING MODERATE GRADE WITH PROBABLE FOCAL FATTY SPARING NEAR THE GALLBLADDER 
FOSSA.

## 2020-03-13 ENCOUNTER — HOSPITAL ENCOUNTER (OUTPATIENT)
Age: 40
End: 2020-03-13
Payer: COMMERCIAL

## 2020-03-13 PROCEDURE — 43239 EGD BIOPSY SINGLE/MULTIPLE: CPT

## 2020-03-13 PROCEDURE — 88305 TISSUE EXAM BY PATHOLOGIST: CPT

## 2020-03-13 PROCEDURE — 45378 DIAGNOSTIC COLONOSCOPY: CPT

## 2020-09-25 ENCOUNTER — HOSPITAL ENCOUNTER (EMERGENCY)
Dept: HOSPITAL 47 - EC | Age: 40
Discharge: HOME | End: 2020-09-25
Payer: COMMERCIAL

## 2020-09-25 VITALS — TEMPERATURE: 97.8 F | RESPIRATION RATE: 18 BRPM

## 2020-09-25 VITALS — DIASTOLIC BLOOD PRESSURE: 65 MMHG | HEART RATE: 94 BPM | SYSTOLIC BLOOD PRESSURE: 106 MMHG

## 2020-09-25 DIAGNOSIS — K21.9: ICD-10-CM

## 2020-09-25 DIAGNOSIS — S52.501A: Primary | ICD-10-CM

## 2020-09-25 DIAGNOSIS — S52.611A: ICD-10-CM
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
## 2020-09-25 DIAGNOSIS — Z88.0: ICD-10-CM
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
## 2020-09-25 DIAGNOSIS — I10: ICD-10-CM

## 2020-09-25 DIAGNOSIS — Z79.899: ICD-10-CM
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
## 2020-09-25 PROCEDURE — 73080 X-RAY EXAM OF ELBOW: CPT

## 2020-09-25 PROCEDURE — 99283 EMERGENCY DEPT VISIT LOW MDM: CPT

## 2020-09-25 PROCEDURE — 64450 NJX AA&/STRD OTHER PN/BRANCH: CPT

## 2020-09-25 PROCEDURE — 96372 THER/PROPH/DIAG INJ SC/IM: CPT

## 2020-09-25 PROCEDURE — 29125 APPL SHORT ARM SPLINT STATIC: CPT

## 2020-09-25 PROCEDURE — 73090 X-RAY EXAM OF FOREARM: CPT

## 2020-09-25 NOTE — ED
Upper Extremity HPI





- General


Chief Complaint: Extremity Injury, Upper


Stated Complaint: R Arm Injury


Time Seen by Provider: 09/25/20 20:57


Source: patient


Mode of arrival: ambulatory


Limitations: no limitations





- History of Present Illness


Initial Comments: 





Patient is 40-year-old male presenting to the emergency department with chief 

complaint of right arm pain.  Patient states he was going up a hill, lost 

balance and went backwards.  States he attempted to brace himself during the 

fall with his right arm and developed pain.  Patient states most of the pain is 

in his right elbow along the forearm as well.  Patient states the pain is 10/10 

and sharp in nature.  States he does not want to move his arm due to pain.  

Denies any numbness or tingling.  Denies taking medications to alleviate the 

symptoms.  States this occurred about half hour prior to arrival.





- Related Data


                                Home Medications











 Medication  Instructions  Recorded  Confirmed


 


Omeprazole 20 mg PO DAILY 03/11/20 09/25/20


 


Losartan Potassium 100 mg PO DAILY 09/25/20 09/25/20


 


Metoprolol Succinate (ER) [Toprol 50 mg PO DAILY 09/25/20 09/25/20





Xl]   


 


Sildenafil Citrate 100 mg PO DAILY PRN 09/25/20 09/25/20


 


amLODIPine [Norvasc] 5 mg PO DAILY 09/25/20 09/25/20


 


hydroCHLOROthiazide [Hydrodiuril] 25 mg PO DAILY 09/25/20 09/25/20











                                    Allergies











Allergy/AdvReac Type Severity Reaction Status Date / Time


 


Penicillins Allergy  Rash/Hives Verified 09/25/20 21:43














Review of Systems


ROS Statement: 


Those systems with pertinent positive or pertinent negative responses have been 

documented in the HPI.





ROS Other: All systems not noted in ROS Statement are negative.





Past Medical History


Past Medical History: GERD/Reflux, Hypertension


Additional Past Medical History / Comment(s): anxiety


History of Any Multi-Drug Resistant Organisms: None Reported


Past Surgical History: Tonsillectomy


Additional Past Surgical History / Comment(s): vasectomy


Past Anesthesia/Blood Transfusion Reactions: No Reported Reaction


Past Psychological History: Anxiety


Smoking Status: Never smoker


Past Alcohol Use History: Occasional


Past Drug Use History: None Reported





- Past Family History


  ** Mother


Family Medical History: No Reported History





General Exam


Limitations: no limitations


General appearance: alert


Head exam: Present: atraumatic, normocephalic, normal inspection


Eye exam: Present: normal appearance, PERRL, EOMI


Pupils: Present: normal accommodation


ENT exam: Present: normal exam, normal oropharynx, mucous membranes moist, TM's 

normal bilaterally, normal external ear exam


Neck exam: Present: normal inspection, full ROM.  Absent: tenderness, 

meningismus


Respiratory exam: Present: normal lung sounds bilaterally.  Absent: respiratory 

distress, wheezes, rales


Cardiovascular Exam: Present: regular rate, normal rhythm, normal heart sounds


Extremities exam: Present: normal inspection (Some swelling noticed along the 

right distal forearm), tenderness (Tenderness along the midforearm and the 

distal forearm.), normal capillary refill, joint swelling (Mild swelling noted 

on the  medial aspect right wrist.), other (+2 ulnar hemorrhage a pulse of 

bilateral.  Sensation intact in the right upper extremity.).  Absent: full ROM 

(Limited range of motion in the hand due to pain), pedal edema, calf tenderness


Back exam: Present: normal inspection, full ROM.  Absent: tenderness, CVA 

tenderness (R), CVA tenderness (L)


Neurological exam: Present: alert, oriented X3, normal gait


Psychiatric exam: Present: normal affect, normal mood


Skin exam: Present: warm, dry, intact, normal color





Course


                                   Vital Signs











  09/25/20





  20:48


 


Temperature 97.8 F


 


Pulse Rate 87


 


Respiratory 18





Rate 


 


Blood Pressure 110/57


 


O2 Sat by Pulse 96





Oximetry 














Procedures





- Nerve Block


Consent Obtained: verbal consent


Local Anesthetic Used: Lidocaine 1%


Amount of anesthesia used: 6


Side: right


Nerve Blocks: radial, hematoma block


Procedure Successful: Yes


Complications: none


Patient Tolerated Procedure: well, no complications





- Orthopedic Splinting/Casting


  ** Injury #1


Side: right


Upper Extremity Injury Location: short arm


Upper Extremity Immobilizer: volar splint, Ace wrap, synthetic pre-padded splint





Medical Decision Making





- Medical Decision Making





Patient is a 40-year-old male presenting to the emergency department with a 

chief complaint of right arm pain.  On physical examination, patient is 

neurovascularly intact in the right upper extremity.  He does have focal 

tenderness along the midshaft and distal forearm.  Fracture was suspected he was

 immediately given IM morphine.  Later, he also received IM Dilaudid.  There is 

some swelling along the medial aspect of the distal forearm.  X-ray reveals a 

metaphyseal fracture of the distal right radius.  There is also an avulsion of 

the ulnar styloid.  Hematoma block was performed which alleviated some of the 

discomfort.  Finger traps were applied and the arm was left to hang for about 1

5-20 minutes.  Short arm splint was applied.  Patient was advised to alternate 

between Tylenol and Motrin for pain control.  He was advised to follow with 

orthopedic specialist.  Strict return parameters were thoroughly discussed the 

patient was understanding ago.  Case discussed with physician.





Disposition


Clinical Impression: 


 Distal radius fracture, right, Fracture of ulnar styloid





Disposition: HOME SELF-CARE


Condition: Stable


Instructions (If sedation given, give patient instructions):  Arm Fracture in 

Adults (ED)


Additional Instructions: 


Alternate between Tylenol and Motrin for pain control.  Apply ice compress.  

Follow-up with orthopedics.


Is patient prescribed a controlled substance at d/c from ED?: No


Referrals: 


Callum Arroyo MD [STAFF PHYSICIAN] - 1-2 days


Carlos Alberto Cary MD [STAFF PHYSICIAN] - 1-2 days


Time of Disposition: 22:50

## 2020-09-25 NOTE — XR
EXAMINATION TYPE: XR elbow complete RT

 

DATE OF EXAM: 9/25/2020

 

COMPARISON: None

 

HISTORY: Trauma, fall, pain

 

TECHNIQUE: Three-view right elbow

 

FINDINGS: Anterior fat pad is normal. No elevation of posterior fat pad is evident. Ulnar spur is pre
sent from the olecranon. Radius aligns normally with the humerus. No acute fractures are evident.

 

Follow-up images of the elbow could be performed 7-10 days from acute trauma for continued pain.

 

IMPRESSION:

1.  No acute osseous abnormality right elbow

## 2020-09-25 NOTE — XR
EXAMINATION TYPE: XR forearm RT

 

DATE OF EXAM: 9/25/2020

 

COMPARISON: None

 

HISTORY: Fall twisting arm, pain

 

TECHNIQUE: 2 view right forearm

 

FINDINGS: There is an impacted fracture of the distal metaphyseal radius. Ulnar styloid avulsion appe
ars to be present. No additional fractures are evident.

 

IMPRESSION:

1.  Transverse fracture distal metaphyseal radius.

2. Ulnar styloid avulsion

## 2021-04-21 ENCOUNTER — HOSPITAL ENCOUNTER (OUTPATIENT)
Dept: HOSPITAL 47 - SLEEP | Age: 41
End: 2021-04-21
Attending: INTERNAL MEDICINE
Payer: COMMERCIAL

## 2021-04-21 DIAGNOSIS — Z90.09: ICD-10-CM

## 2021-04-21 DIAGNOSIS — E03.9: ICD-10-CM

## 2021-04-21 DIAGNOSIS — I10: ICD-10-CM

## 2021-04-21 DIAGNOSIS — K21.9: ICD-10-CM

## 2021-04-21 DIAGNOSIS — R40.0: Primary | ICD-10-CM

## 2021-04-21 PROCEDURE — 99211 OFF/OP EST MAY X REQ PHY/QHP: CPT

## 2021-04-21 NOTE — CONS
CONSULTATION



DATE OF SERVICE:

04/21/2021.



41-year-old gentleman who has been evaluated in [facility] for sleepiness and

tiredness, which he has after  long hours of sleep.



HISTORY OF PRESENT ILLNESS/SLEEP-WAKE EVALUATION:

The patient sleeps by himself so no clear information about his snoring.  He wakes up

tired in the morning, has difficulties to pay attention, worry about his sleep, has

problems with the memory, concentration, irritability, anxiety, sexual dysfunction,

usually he does not take any naps.  During the night he has feeling like he is falling.

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

and from 2:00 pm until 8 a.m. on days off.



No history of hypnagogic hallucinations, sleep paralysis or cataplexy.  [place]

Sleepiness Scale is 1.



PAST MEDICAL HISTORY:

Positive for hypothyroidism, hypertension, acid reflux.



PAST SURGICAL HISTORY:

Right arm fracture in 2020, tonsillectomy.



MEDICATIONS:

Synthroid 25 mcg once a day, omeprazole 20 mg once a day, Norvasc 5 mg once a day,

hydrochlorothiazide once a day.  Cozaar 100 mg once a day, metoprolol 50 mg once a day.



SOCIAL HISTORY:

Negative for smoking. Alcohol consumption occasional.



FAMILY HISTORY:

Arthritis, diabetes.



REVIEW OF SYSTEMS:

Feeling tired after long hours of sleep.  On days off he sleeps from 2:00 pm until 8

a.m.



No fevers.  No double vision.  No recent chest pain.  No shortness of breath.  No

abdominal pain.  No bleeding episodes.  No blood in the urine.  No seizure episodes.



PHYSICAL EXAMINATION:

GENERAL:  gentleman without distress.

/79, HR 85, RR 15, height 5 feet 8 inches, weight 234.2, temperature 97.8, oxygen

saturation at room air 98%. BMI 35.5.

HEENT: PERRLA, EOMI. Oropharynx low position of soft palate.  Mallampati 3.

Neck 16 inches in circumference.

NECK:  Supple, no JVD.  Thyroid is not palpable.

LUNGS:  Clear to percussion and to auscultation.  Good air exchange.  No wheezing or

rhonchi.

HEART:  S1, S2 regular.  No murmurs, gallops, or rubs.

ABDOMEN:  Slightly obese. Soft and nontender.  Bowel sounds are present.  No

organomegaly appreciated.

EXTREMITIES: No clubbing or cyanosis.

CNS:  Awake, alert, and oriented X3.  Cranial nerves 2 to 7 intact.  There is no

fasciculation or atrophy. noted.  No focal deficits observed.



IMPRESSION:

1. Feeling tiredness and sleepiness after many hours of sleep on days off.  Patient

    sleeps from 2:00 pm until 8, 8:30 am, low position of soft palate, Mallampati 3,

    borderline size of neck, possible obstructive sleep apnea-hypopnea syndrome.

2. Differential diagnosis should include idiopathic hypersomnia with disordered people

    sleeps many hours.

3. Hypertension.

4. Acid reflux.

5. Hypothyroidism.

6. Status post tonsillectomy.

7. Night shift worker.



PLAN:

1. Polysomnography for evaluation of patient's breathing during sleep.

2. CPAP/BiPAP titration if sleep study confirms obstructive sleep apnea-hypopnea

    syndrome.

3. Preferable position during sleep on the side.

4. No driving if patient feels any sleepiness.

5. I will see patient for follow up visit to explain results of testing and following

    plan.



Thank you very much for referring this patient for consultation.



Sincerely,









Jaydon Jones MD, PhD, FAASM

Diplomat of [employer]

American Board of [employer] Medicine

Medical Director of [facility] [place]



MMODL / IJN: [id number] / Job#: [id number]

## 2021-07-21 ENCOUNTER — HOSPITAL ENCOUNTER (OUTPATIENT)
Dept: HOSPITAL 47 - SLEEP | Age: 41
End: 2021-07-21
Attending: INTERNAL MEDICINE
Payer: COMMERCIAL

## 2021-07-21 DIAGNOSIS — G47.30: Primary | ICD-10-CM

## 2021-07-21 DIAGNOSIS — K21.9: ICD-10-CM

## 2021-07-21 DIAGNOSIS — Z79.899: ICD-10-CM

## 2021-07-21 DIAGNOSIS — I10: ICD-10-CM

## 2021-07-21 DIAGNOSIS — E03.9: ICD-10-CM

## 2021-07-21 DIAGNOSIS — F41.9: ICD-10-CM

## 2021-07-21 DIAGNOSIS — Z90.09: ICD-10-CM

## 2021-07-21 DIAGNOSIS — Z88.0: ICD-10-CM

## 2021-07-21 DIAGNOSIS — G47.61: ICD-10-CM

## 2021-07-21 NOTE — SFUN
SLEEP CENTER FOLLOW UP NOTE



DATE OF SERVICE:

07/21/2021



CLINICAL:

41-year-old gentleman has been followed in Sleep Center to discuss results of

diagnostic polysomnogram and following plan.



I discussed results of polysomnogram with the patient in detail.  Total apnea-hypopnea

index during the sleep study was 4.0, which considered to be in normal range by today's

criteria, but amount of apnea-hypopnea index in REM sleep was significantly increased

at 20.3.



EMG showed significant amount of periodic limb movements of 25.3 times per hour but

only 1.1 microarousals per hour.



Patient continued to feel tiredness after awakenings from sleep. He does not feel

refreshed after sleep.



MEDICATIONS:

Synthroid 25 mcg once a day, omeprazole 20 mg once a day, Norvasc 5 mg once a day,

hydrochlorothiazide once a day.  Cozaar 100 mg once a day, metoprolol 50 mg once a day.



PHYSICAL EXAMINATION:

GENERAL: Patient in no distress /75, HR 88, RR 15, height 5 feet 6 inches, weight

227, BMI 36.6, temperature 98.4.  Oxygen saturation:  97%.

HEENT: PERRLA, EOMI, evaluation of oropharynx showed tongue protrudes midline. On exam,

oropharynx low position of soft palate. Mallampati III. Neck 16 inches in

circumference.

NECK:  Supple, no JVD.  Thyroid is not palpable.

LUNGS:  Clear to percussion and to auscultation.  Good air exchange.  No wheezing or

rhonchi.

HEART:  S1, S2 regular.  No murmurs, gallops, or rubs.

ABDOMEN:  Soft and nontender.  Bowel sounds are present.  No organomegaly appreciated.

EXTREMITIES: No clubbing or cyanosis.

CNS:  Awake, alert, and oriented X3.  Cranial nerves 2 to 7 intact.  There is no

fasciculation or atrophy. No focal deficits observed.



IMPRESSION:

1. Minimal abnormalities of respiration during the sleep.  Total apnea-hypopnea index

    4.0 with essentially to be in normal range by today's criteria. In REM sleep,

    overall, apnea-hypopnea index increased to 20.3.

2. Periodic limb movements have been documented during the sleep study 25.3 times per

    hour with 1.1 (  ) per hour.

3. Hypertension.

4. Acid reflux.

5. Anxiety.

6. Hypothyroidism.

7. Status post tonsillectomy.

8. Night shift work.



PLAN:

1. I will start patient on treatment with Mirapex for periodic limb movements.  I.

2. Will see patient for follow-up visit in about 2-3 months to evaluate clinical

    response and treatment.

3. The patient is still a candidate for the treatment with CPAP, I believe, although

    standard criterias today for treatment with CPAP were not met.

4. Aggressive losing weight program.

5. Preferable position during sleep on the side.

6. No driving if feeling sleepiness.

Thank you very much for allowing me to participate in management of the patient.



Sincerely,







Jaydon Jones MD, PhD, FAASM

Diplomat of [employer]ties

Sleep Medicine Board of American Board of Internal Medicine

Medical Director of [facility]





MMODL / [place]: [id number] / Job#: [id number]

## 2024-04-11 ENCOUNTER — HOSPITAL ENCOUNTER (EMERGENCY)
Dept: HOSPITAL 47 - EC | Age: 44
Discharge: HOME | End: 2024-04-11
Payer: COMMERCIAL

## 2024-04-11 VITALS — HEART RATE: 72 BPM

## 2024-04-11 VITALS — RESPIRATION RATE: 18 BRPM | TEMPERATURE: 98.1 F

## 2024-04-11 VITALS — SYSTOLIC BLOOD PRESSURE: 115 MMHG | DIASTOLIC BLOOD PRESSURE: 85 MMHG

## 2024-04-11 DIAGNOSIS — J00: Primary | ICD-10-CM

## 2024-04-11 DIAGNOSIS — B34.9: ICD-10-CM

## 2024-04-11 DIAGNOSIS — Z88.0: ICD-10-CM

## 2024-04-11 PROCEDURE — 71046 X-RAY EXAM CHEST 2 VIEWS: CPT

## 2024-04-11 PROCEDURE — 99284 EMERGENCY DEPT VISIT MOD MDM: CPT

## 2024-04-11 PROCEDURE — 94640 AIRWAY INHALATION TREATMENT: CPT

## 2024-04-11 RX ADMIN — IPRATROPIUM BROMIDE AND ALBUTEROL SULFATE STA ML: .5; 3 SOLUTION RESPIRATORY (INHALATION) at 12:59

## 2024-04-11 NOTE — XR
EXAMINATION TYPE: XR chest 2V

 

DATE OF EXAM: 4/11/2024

 

COMPARISON: NONE

 

HISTORY: Chest pain

 

TECHNIQUE:  Frontal and lateral views of the chest are obtained.

 

FINDINGS:  

 

There is no focal air space opacity.

 

No evidence for pneumothorax.  No pleural effusion.

 

The cardiac silhouette size is within normal limits.

 

The osseous structures are grossly intact.

 

IMPRESSION:  

 

1.  No acute cardiopulmonary process.

## 2024-04-11 NOTE — ED
URI HPI





- General


Chief Complaint: Upper Respiratory Infection


Stated Complaint: GUEVARA


Time Seen by Provider: 04/11/24 11:40


Source: patient, RN notes reviewed


Mode of arrival: ambulatory


Limitations: no limitations





- History of Present Illness


Initial Comments: 


This is a 44-year-old male with history of hypertension and hypothyroidism who 

presents to the ED with chief complaint of cough, wheezing, shortness of breath 

over the past week and a half.  Patient states that he initially went to an 

urgent care where he was prescribed promethazine discharged home. patient went 

to see his primary care provider on Monday where he was given a shot of steroid 

in addition to at home Tylenol and azithromycin.  Patient states that he 

received another steroid shot yesterday. States he has taken 2 pills of the 

azithromycin.  He denies known fevers at home did not have any thermometer yet 

states he has felt warm.  States his cough is dry, however was productive with 

symptoms originally began.  Denies history of asthma and or COPD.  States he is 

taken Motrin at home relief.  He was swabbed for flu and COVID last week which 

came back negative.  States he has not had a chest x-ray.








- Related Data


                                Home Medications











 Medication  Instructions  Recorded  Confirmed


 


Omeprazole 20 mg PO DAILY 03/11/20 09/25/20


 


Losartan Potassium 100 mg PO DAILY 09/25/20 09/25/20


 


Metoprolol Succinate (ER) [Toprol 50 mg PO DAILY 09/25/20 09/25/20





Xl]   


 


Sildenafil Citrate 100 mg PO DAILY PRN 09/25/20 09/25/20


 


amLODIPine [Norvasc] 5 mg PO DAILY 09/25/20 09/25/20


 


hydroCHLOROthiazide [Hydrodiuril] 25 mg PO DAILY 09/25/20 09/25/20








                                  Previous Rx's











 Medication  Instructions  Recorded


 


Benzonatate [Tessalon Perles] 100 mg PO TID PRN #15 capsule 04/11/24











                                    Allergies











Allergy/AdvReac Type Severity Reaction Status Date / Time


 


Penicillins Allergy  Rash/Hives Verified 09/25/20 21:43














Review of Systems


ROS Statement: 


Those systems with pertinent positive or pertinent negative responses have been 

documented in the HPI.





ROS Other: All systems not noted in ROS Statement are negative.





Past Medical History


Past Medical History: GERD/Reflux, Hypertension


Additional Past Medical History / Comment(s): anxiety


History of Any Multi-Drug Resistant Organisms: None Reported


Past Surgical History: Tonsillectomy


Additional Past Surgical History / Comment(s): vasectomy


Past Anesthesia/Blood Transfusion Reactions: No Reported Reaction


Past Psychological History: Anxiety


Smoking Status: Never smoker


Past Alcohol Use History: Occasional


Past Drug Use History: None Reported





- Past Family History


  ** Mother


Family Medical History: No Reported History





General Exam


Limitations: no limitations


General appearance: alert, in no apparent distress


Head exam: Present: atraumatic, normocephalic, normal inspection


Eye exam: Present: normal appearance, PERRL, EOMI.  Absent: scleral icterus, 

conjunctival injection, periorbital swelling


ENT exam: Present: normal exam, mucous membranes moist


Neck exam: Present: normal inspection.  Absent: tenderness, meningismus, 

lymphadenopathy


Respiratory exam: Present: normal lung sounds bilaterally, decreased breath 

sounds.  Absent: respiratory distress, wheezes, rales, rhonchi, stridor


Cardiovascular Exam: Present: regular rate, normal rhythm, normal heart sounds. 

Absent: systolic murmur, diastolic murmur, rubs, gallop, clicks


GI/Abdominal exam: Present: soft, normal bowel sounds.  Absent: distended, 

tenderness, guarding, rebound, rigid


Extremities exam: Present: normal inspection, full ROM, normal capillary refill.

 Absent: tenderness, pedal edema, joint swelling, calf tenderness


Back exam: Present: normal inspection


Neurological exam: Present: alert, oriented X3, CN II-XII intact


Psychiatric exam: Present: normal affect, normal mood


Skin exam: Present: warm, dry, intact, normal color.  Absent: rash





Course


                                   Vital Signs











  04/11/24 04/11/24 04/11/24





  11:39 11:54 13:00


 


Temperature 98.1 F  


 


Pulse Rate 77  74


 


Respiratory 18 18 





Rate   


 


Blood Pressure 117/76  


 


O2 Sat by Pulse 97  





Oximetry   














  04/11/24 04/11/24





  13:08 13:12


 


Temperature  98.1 F


 


Pulse Rate 76 72


 


Respiratory  18





Rate  


 


Blood Pressure  115/85


 


O2 Sat by Pulse  98





Oximetry  














Medical Decision Making





- Medical Decision Making


Was pt. sent in by a medical professional or institution (, PA, NP, urgent 

care, hospital, or nursing home...) When possible be specific


@ -No


Did you speak to anyone other than the patient for history (EMS, parent, family,

police, friend...)? What history was obtained from this source 


@ -No


Did you review nursing and triage notes (agree or disagree)? Why? 


@ -I reviewed and agree with nursing and triage notes


Were old charts reviewed (outside hosp., previous admission, EMS record, old 

EKG, old radiological studies, urgent care reports/EKG's, nursing home records)?

Report findings 


@ -No old charts were reviewed


Differential Diagnosis (chest pain, altered mental status, abdominal pain women,

abdominal pain men, vaginal bleeding, weakness, fever, dyspnea, syncope, 

headache, dizziness, GI bleed, back pain, seizure, CVA, palpatations, mental 

health, musculoskeletal)? 


@COVID 19, RSV, influenza, pneumonia, acute bronchitis, URI, this list is not 

all inclusive


EKG interpreted by me (3pts min.).


@ -None


X-rays interpreted by me (1pt min.).


@ -Chest x-ray no acute cardiopulmonary process


CT interpreted by me (1pt min.).


@ -None done


U/S interpreted by me (1pt. min.).


@ -None done


What testing was considered but not performed or refused? (CT, X-rays, U/S, 

labs)? Why?


@ -None


What meds were considered but not given or refused? Why?


@ -None


Did you discuss the management of the patient with other professionals 

(professionals i.e. , PA, NP, lab, RT, psych nurse, , , 

teacher, , )? Give summary


@ -No


Was smoking cessation discussed for >3mins.?


@ -No


Was critical care preformed (if so, how long)?


@ -No


Were there social determinants of health that impacted care today? How? 

(Homelessness, low income, unemployed, alcoholism, drug addiction, 

transportation, low edu. Level, literacy, decrease access to med. care, shelter, 

rehab)?


@ -No


Was there de-escalation of care discussed even if they declined (Discuss DNR or 

withdrawal of care, Hospice)? DNR status


@ -No


What co-morbidities impacted this encounter? (DM, HTN, Smoking, COPD, CAD, 

Cancer, CVA, ARF, Chemo, Hep., AIDS, mental health diagnosis, sleep apnea, 

morbid obesity)?


@ -None


Was patient admitted / discharged? Hospital course, mention meds given and 

route, prescriptions, significant lab abnormalities, going to OR and other 

pertinent info.


@ -44-year-old male with complaint of shortness..  Patient sent for chest x-ray 

with no acute findings of pneumonia and or bronchitis.  Discussed with patient. 

Options discussed with COVID, flu, RSV, patient declines at this time.  Patient 

received DuoNeb breathing treatment, dates that his symptoms have improved 

after.  Patient states that overall her symptoms have improved and he feels 

better today than he has in the last few days.  Recommend that patient continues

with prednisone in relation to azithromycin prescribed by his primary care 

provider.  Patient provided with prescription for Tessalon pearls and instructed

to follow up with PCP within the next week. Discussed with Dr. Simmons. 


Undiagnosed new problem with uncertain prognosis?


@ -No


Drug Therapy requiring intensive monitoring for toxicity (Heparin, Nitro, 

Insulin, Cardizem)?


@ -No


Were any procedures done?


@ -No


Diagnosis/symptom?


@ -common cold, cough, viral infection


Acute, or Chronic, or Acute on Chronic?


@ -acute


Uncomplicated (without systemic symptoms) or Complicated (systemic symptoms)?


@ -uncomplicated


Side effects of treatment?


@ -No


Exacerbation, Progression, or Severe Exacerbation?


@ -No


Poses a threat to life or bodily function? How? (Chest pain, USA, MI, pneumonia,

PE, COPD, DKA, ARF, appy, cholecystitis, CVA, Diverticulitis, Homicidal, 

Suicidal, threat to staff... and all critical care pts)


@ -No








Disposition


Clinical Impression: 


 Common cold, Cough in adult





Narrative: 


Please return to the Emergency Department if symptoms worsen or any other 

concerns.  Continue use of oral steroids in addition to azithromycin prescribed 

by your primary care provider. Use tessalon pearls as needed for cough. 





Disposition: HOME SELF-CARE


Condition: Good


Instructions (If sedation given, give patient instructions):  Cold Symptoms (ED)


Prescriptions: 


Benzonatate [Tessalon Perles] 100 mg PO TID PRN #15 capsule


 PRN Reason: Cough


Is patient prescribed a controlled substance at d/c from ED?: No


Referrals: 


Ele Coleman MD [Primary Care Provider] - 1-2 days


Time of Disposition: 12:50

## 2024-04-17 ENCOUNTER — HOSPITAL ENCOUNTER (OUTPATIENT)
Dept: HOSPITAL 47 - RADXRMAIN | Age: 44
Discharge: HOME | End: 2024-04-17
Attending: FAMILY MEDICINE
Payer: COMMERCIAL

## 2024-04-17 DIAGNOSIS — J98.01: Primary | ICD-10-CM

## 2024-04-17 DIAGNOSIS — R06.2: ICD-10-CM

## 2024-04-17 DIAGNOSIS — R05.2: ICD-10-CM

## 2024-04-17 DIAGNOSIS — R06.02: ICD-10-CM

## 2024-04-17 PROCEDURE — 71046 X-RAY EXAM CHEST 2 VIEWS: CPT

## 2024-04-17 NOTE — XR
EXAMINATION TYPE: XR chest 2V

 

DATE OF EXAM: 4/17/2024

 

COMPARISON: 4/11/2024

 

INDICATION: Cough wheeze short of breath

 

TECHNIQUE:  Frontal and lateral views of the chest are obtained.

 

FINDINGS:  

The heart size is normal.  

The pulmonary vasculature is normal.

The lungs are clear.

 

IMPRESSION:  

1. No acute pulmonary process.

## 2024-04-22 ENCOUNTER — HOSPITAL ENCOUNTER (OUTPATIENT)
Dept: HOSPITAL 47 - RADCTMAIN | Age: 44
Discharge: HOME | End: 2024-04-22
Attending: FAMILY MEDICINE
Payer: COMMERCIAL

## 2024-04-22 DIAGNOSIS — R05.2: ICD-10-CM

## 2024-04-22 DIAGNOSIS — R06.02: ICD-10-CM

## 2024-04-22 DIAGNOSIS — K76.0: Primary | ICD-10-CM

## 2024-04-22 PROCEDURE — 71260 CT THORAX DX C+: CPT

## 2024-04-22 NOTE — CT
EXAMINATION TYPE: CT chest w con

 

DATE OF EXAM: 4/22/2024

 

COMPARISON: None

 

HISTORY: cough

 

CT DLP: 554.2 mGycm

Automated exposure control for dose reduction was used.

 

CONTRAST: 

CT scan of the chest is performed with IV Contrast, patient injected with 100 mL of Isovue 300.

 

FINDINGS:

 

LUNGS: The lungs are grossly clear, there is no concerning parenchymal mass or nodule identified.   T
here is no pleural effusion or pneumothorax seen.  The tracheobronchial tree is patent.

 

MEDIASTINUM: There are no greater than 1 cm hilar or mediastinal lymph nodes.   No pericardial effusi
on is seen.  Thoracic aorta is of normal caliber. The heart is not enlarged. Nonspecific bilateral th
yroid nodularity.

 

UPPER ABDOMEN: There is evidence of hepatic steatosis.

 

OTHER:  No additional significant abnormality is seen.

 

IMPRESSION:  

1. No evidence for focal infiltrate or pulmonary nodule.

2. Hepatic steatosis.

3. Thyroid nodularity.